# Patient Record
Sex: MALE | Race: WHITE
[De-identification: names, ages, dates, MRNs, and addresses within clinical notes are randomized per-mention and may not be internally consistent; named-entity substitution may affect disease eponyms.]

---

## 2018-01-08 ENCOUNTER — HOSPITAL ENCOUNTER (EMERGENCY)
Dept: HOSPITAL 17 - NED | Age: 28
Discharge: LEFT BEFORE DISCHARGE | End: 2018-01-08
Payer: MEDICAID

## 2018-01-08 VITALS
DIASTOLIC BLOOD PRESSURE: 66 MMHG | HEART RATE: 70 BPM | SYSTOLIC BLOOD PRESSURE: 137 MMHG | OXYGEN SATURATION: 99 % | TEMPERATURE: 97.7 F | RESPIRATION RATE: 16 BRPM

## 2018-01-08 DIAGNOSIS — R01.1: ICD-10-CM

## 2018-01-08 DIAGNOSIS — Z88.6: ICD-10-CM

## 2018-01-08 DIAGNOSIS — Z87.442: ICD-10-CM

## 2018-01-08 DIAGNOSIS — K08.89: Primary | ICD-10-CM

## 2018-01-08 DIAGNOSIS — F17.200: ICD-10-CM

## 2018-01-08 DIAGNOSIS — Z88.5: ICD-10-CM

## 2018-01-08 PROCEDURE — 99281 EMR DPT VST MAYX REQ PHY/QHP: CPT

## 2018-01-09 NOTE — PD
HPI


Chief Complaint:  Oral / Dental Pain or Problem


Time Seen by Provider:  20:10


Travel History


International Travel<30 days:  No


Contact w/Intl Traveler<30days:  No


Traveled to known affect area:  No





History of Present Illness


HPI


Pt is a 27-year-old male presenting with a possible dental abscess. He states 

it started draining earlier this afternoon. No fevers, Pain increased after it 

popped.  Patient is not tender to contact his dentist.  Pain is exacerbated 

with chewing or movement.  There is no alleviating factors.  Onset was gradual.





PFSH


Past Medical History


Blood Disorders:  No


Cardiovascular Problems:  Yes (HEART MURMUR)


Diminished Hearing:  No


Kidney Stones:  Yes





Social History


Alcohol Use:  No


Tobacco Use:  Yes (1/2 PPD)


Substance Use:  No





Allergies-Medications


(Allergen,Severity, Reaction):  


Coded Allergies:  


     acetaminophen (Unverified  Allergy, Severe, Itching, 8/15/17)


     ipratropium (Unverified  Allergy, Severe, swelling, 8/15/17)


     oxycodone (Unverified  Allergy, Severe, Itching, 8/15/17)


Reported Meds & Prescriptions





Reported Meds & Active Scripts


Active


Lortab 5 mg/325 mg (Hydrocodone/Acetaminophen 5 mg/325 mg) 1 Tab 1 Tab PO Q6H 

PRN


Diclofenac Sodium 75 Mg Tab 75 Mg PO BID 20 Days








Review of Systems


Except as stated in HPI:  all other systems reviewed are Neg


HENT:  Positive: Dental Difficulties





Physical Exam


Narrative


GENERAL: Well-developed, well-nourished, alert male.  Presenting in no acute 

distress.


SKIN: Warm and dry.


HEAD: Normocephalic.


EYES: No scleral icterus. No injection or drainage. 


CARDIOVASCULAR: Regular rate


RESPIRATORY:  No accessory muscle use.








Data


Data


Last Documented VS





Vital Signs








  Date Time  Temp Pulse Resp B/P (MAP) Pulse Ox O2 Delivery O2 Flow Rate FiO2


 


1/8/18 19:29 97.7 70 16 137/66 (89) 99 Room Air  











MDM


Medical Decision Making


Medical Screen Exam Complete:  Yes


Emergency Medical Condition:  Yes


Interpretation(s)





Vital Signs








  Date Time  Temp Pulse Resp B/P (MAP) Pulse Ox O2 Delivery O2 Flow Rate FiO2


 


1/8/18 19:29 97.7 70 16 137/66 (89) 99 Room Air  








Differential Diagnosis


Dental abscess versus dental caries versus dentalgia versus cellulitis versus 

other


Narrative Course


Patient is a 27-year-old well-appearing male presenting for evaluation of 

possible dental abscess.  Patient's vital signs are stable, he is awaiting bed 

placement.





Patient was called to be bedded, he was no longer in the emergency department.





AMA: The risks of leaving against medical advice without further evaluation 

treatment were discussed with the patient.  These risks include cardiac 

dysfunction, cardiac dysrhythmia, possible heart attack, possible stroke or 

death.  The patient indicated understanding of these risks and appeared to have 

the capacity to make this decision.





Diagnosis





 Primary Impression:  


 Left against medical advice


Disposition:  07 AGAINST MEDICAL ADVICE











Neisha Huffman Jan 9, 2018 20:02